# Patient Record
Sex: MALE | Race: BLACK OR AFRICAN AMERICAN | Employment: FULL TIME | URBAN - METROPOLITAN AREA
[De-identification: names, ages, dates, MRNs, and addresses within clinical notes are randomized per-mention and may not be internally consistent; named-entity substitution may affect disease eponyms.]

---

## 2022-02-16 ENCOUNTER — HOSPITAL ENCOUNTER (OUTPATIENT)
Dept: ULTRASOUND IMAGING | Age: 27
Discharge: HOME | End: 2022-02-16
Attending: UROLOGY
Payer: COMMERCIAL

## 2022-02-16 DIAGNOSIS — N50.819 PAIN IN TESTICLE, UNSPECIFIED LATERALITY: ICD-10-CM

## 2022-02-16 PROCEDURE — 76870 US EXAM SCROTUM: CPT

## 2022-06-15 ENCOUNTER — OFFICE VISIT (OUTPATIENT)
Dept: UROLOGY | Age: 27
End: 2022-06-15
Payer: COMMERCIAL

## 2022-06-15 DIAGNOSIS — N50.819 PAIN IN TESTICLE, UNSPECIFIED LATERALITY: Primary | ICD-10-CM

## 2022-06-15 LAB
BILIRUBIN, URINE, POC: NORMAL
BLOOD URINE, POC: NEGATIVE
GLUCOSE URINE, POC: NEGATIVE
KETONES, URINE, POC: NORMAL
LEUKOCYTE ESTERASE, URINE, POC: NEGATIVE
NITRITE, URINE, POC: NEGATIVE
PH, URINE, POC: 5.5 (ref 4.6–8)
PROTEIN,URINE, POC: NORMAL
SPECIFIC GRAVITY, URINE, POC: 1.03 (ref 1–1.03)
URINALYSIS CLARITY, POC: NORMAL
URINALYSIS COLOR, POC: NORMAL
UROBILINOGEN, POC: NORMAL

## 2022-06-15 PROCEDURE — 99212 OFFICE O/P EST SF 10 MIN: CPT | Performed by: UROLOGY

## 2022-06-15 PROCEDURE — 81003 URINALYSIS AUTO W/O SCOPE: CPT | Performed by: UROLOGY

## 2022-06-15 ASSESSMENT — ENCOUNTER SYMPTOMS
NAUSEA: 0
BACK PAIN: 0

## 2022-06-15 NOTE — PROGRESS NOTES
[facility]  [address] [address]  [phone]    Sonia Hutchison  : 1995    Chief Complaint   Patient presents with    Follow-up    Testicle Pain        HPI     Sonia Hutchison is a 23 y.o. male referred by Shawn MARI for evaluation and treatment of left scrotal pain. Had scrotal US in 3-21:   He had Scrotal US on 3/4/21, results again reviewed - Left varicocele and 2 epididymal head cyst noted. \"Left epididymal head is enlarged secondary to 12 x 9 x 8mm and 6 x 7 x 6mm epididymal cysts.      He states that he has been having left testicular pain since around . He noted it after straining at stool. Has continues to have dull left testicular pain when walking around   He has read about varicocele and thinks that he has one . He states that the left testis will lie sideways. Amanda Goes is described as 7 out of 10. Exam showed 2 cm sebaceous cyst left superior scrotum. normal testes, no palpable masses, mild tenderness left superior epididymis. Don't see a large varicocele on exam. Exam consistent with epididymitis. Gave doxycycline in . .  Scrotal US 22:   Impression   1. Left epididymal head cyst.   2. Otherwise unremarkable exam     He states that there is no pain today. He hasn't had pain in 4 months. No past medical history on file. No past surgical history on file. Current Outpatient Medications   Medication Sig Dispense Refill    doxycycline monohydrate (MONODOX) 100 MG capsule Take 100 mg by mouth 2 times daily       No current facility-administered medications for this visit.      No Known Allergies  Social History     Socioeconomic History    Marital status: Single     Spouse name: Not on file    Number of children: Not on file    Years of education: Not on file    Highest education level: Not on file   Occupational History    Not on file   Tobacco Use    Smoking status: Current Some Day Smoker    Smokeless tobacco: Not on file Substance and Sexual Activity    Alcohol use: Yes     Alcohol/week: 1.0 standard drink    Drug use: Not on file    Sexual activity: Not on file   Other Topics Concern    Not on file   Social History Narrative    Not on file     Social Determinants of Health     Financial Resource Strain:     Difficulty of Paying Living Expenses: Not on file   Food Insecurity:     Worried About Running Out of Food in the Last Year: Not on file    Janes of Food in the Last Year: Not on file   Transportation Needs:     Lack of Transportation (Medical): Not on file    Lack of Transportation (Non-Medical): Not on file   Physical Activity:     Days of Exercise per Week: Not on file    Minutes of Exercise per Session: Not on file   Stress:     Feeling of Stress : Not on file   Social Connections:     Frequency of Communication with Friends and Family: Not on file    Frequency of Social Gatherings with Friends and Family: Not on file    Attends Yazidism Services: Not on file    Active Member of [address] [place] [address] or Organizations: Not on file    Attends Club or Organization Meetings: Not on file    Marital Status: Not on file   Intimate Partner Violence:     Fear of Current or Ex-Partner: Not on file    Emotionally Abused: Not on file    Physically Abused: Not on file    Sexually Abused: Not on file   Housing Stability:     Unable to Pay for Housing in the Last Year: Not on file    Number of [place] in the Last Year: Not on file    Unstable Housing in the Last Year: Not on file     Family History   Problem Relation Age of Onset    Cancer Father     Hypertension Mother     Diabetes Mother        Review of Systems  Constitutional:   Negative for fever. Cardiovascular:  Negative for chest pain. GI:  Negative for nausea. Genitourinary:  Negative for urinary burning. Musculoskeletal:  Negative for back pain. There were no vitals taken for this visit.     Urinalysis  UA - Dipstick  Results for orders placed or performed in visit on 06/15/22   AMB POC URINALYSIS DIP STICK AUTO W/O MICRO   Result Value Ref Range    Color (UA POC)      Clarity (UA POC)      Glucose, Urine, POC Negative Negative    Bilirubin, Urine, POC Small Negative    KETONES, Urine, POC Trace Negative    Specific Gravity, Urine, POC 1.030 1.001 - 1.035    Blood (UA POC) Negative Negative    pH, Urine, POC 5.5 4.6 - 8.0    Protein, Urine, POC Trace Negative    Urobilinogen, POC Normal     Nitrite, Urine, POC Negative Negative    Leukocyte Esterase, Urine, POC Negative Negative       UA - Micro  WBC - 0  RBC - 0  Bacteria - 0  Epith - 0    Physical Exam    Assessment and Plan    ICD-10-CM    1. Pain in testicle, unspecified laterality  N50.819 AMB POC URINALYSIS DIP STICK AUTO W/O MICRO       Orders Placed This Encounter   Procedures    AMB POC URINALYSIS DIP STICK AUTO W/O MICRO       Follow-up and Dispositions    · Return if symptoms worsen or fail to improve.

## 2022-12-13 ENCOUNTER — HOSPITAL ENCOUNTER (EMERGENCY)
Age: 27
Discharge: HOME | End: 2022-12-14
Attending: EMERGENCY MEDICINE
Payer: COMMERCIAL

## 2022-12-13 DIAGNOSIS — J02.0 STREP PHARYNGITIS: Primary | ICD-10-CM

## 2022-12-13 LAB
FLUAV AG NPH QL IA: NEGATIVE
FLUBV AG NPH QL IA: NEGATIVE
SPECIMEN SOURCE: NORMAL
STREP, MOLECULAR: DETECTED

## 2022-12-13 PROCEDURE — 87804 INFLUENZA ASSAY W/OPTIC: CPT

## 2022-12-13 PROCEDURE — 99283 EMERGENCY DEPT VISIT LOW MDM: CPT | Performed by: EMERGENCY MEDICINE

## 2022-12-13 PROCEDURE — 87651 STREP A DNA AMP PROBE: CPT

## 2022-12-13 ASSESSMENT — LIFESTYLE VARIABLES
HOW MANY STANDARD DRINKS CONTAINING ALCOHOL DO YOU HAVE ON A TYPICAL DAY: 1 OR 2
HOW OFTEN DO YOU HAVE A DRINK CONTAINING ALCOHOL: MONTHLY OR LESS

## 2022-12-13 ASSESSMENT — PAIN - FUNCTIONAL ASSESSMENT: PAIN_FUNCTIONAL_ASSESSMENT: 0-10

## 2022-12-13 ASSESSMENT — PAIN DESCRIPTION - DESCRIPTORS: DESCRIPTORS: ACHING;GNAWING

## 2022-12-13 ASSESSMENT — PAIN DESCRIPTION - LOCATION: LOCATION: GENERALIZED;THROAT

## 2022-12-13 ASSESSMENT — PAIN SCALES - GENERAL: PAINLEVEL_OUTOF10: 8

## 2022-12-13 NOTE — Clinical Note
Dago Schwartz was seen and treated in our emergency department on 12/13/2022. He may return to work on 12/14/2022. Please excuse from work missed 12/13/22 overnight due to illness. Thank you! If you have any questions or concerns, please don't hesitate to call.       Micky Mace MD

## 2022-12-14 VITALS
HEIGHT: 70 IN | DIASTOLIC BLOOD PRESSURE: 84 MMHG | BODY MASS INDEX: 27.2 KG/M2 | SYSTOLIC BLOOD PRESSURE: 138 MMHG | OXYGEN SATURATION: 98 % | HEART RATE: 80 BPM | RESPIRATION RATE: 17 BRPM | WEIGHT: 190 LBS | TEMPERATURE: 99.8 F

## 2022-12-14 PROCEDURE — 6370000000 HC RX 637 (ALT 250 FOR IP): Performed by: EMERGENCY MEDICINE

## 2022-12-14 RX ORDER — AMOXICILLIN 500 MG/1
500 CAPSULE ORAL 2 TIMES DAILY
Qty: 20 CAPSULE | Refills: 0 | Status: SHIPPED | OUTPATIENT
Start: 2022-12-14 | End: 2022-12-14 | Stop reason: SDUPTHER

## 2022-12-14 RX ORDER — AMOXICILLIN 500 MG/1
500 CAPSULE ORAL 2 TIMES DAILY
Qty: 20 CAPSULE | Refills: 0 | Status: SHIPPED | OUTPATIENT
Start: 2022-12-14 | End: 2022-12-24

## 2022-12-14 RX ORDER — AMOXICILLIN 500 MG/1
1000 CAPSULE ORAL
Status: COMPLETED | OUTPATIENT
Start: 2022-12-14 | End: 2022-12-14

## 2022-12-14 RX ADMIN — AMOXICILLIN 1000 MG: 500 CAPSULE ORAL at 00:42

## 2022-12-14 ASSESSMENT — ENCOUNTER SYMPTOMS
COUGH: 0
SHORTNESS OF BREATH: 0
DIARRHEA: 0
EYE PAIN: 0
EYE DISCHARGE: 0
WHEEZING: 0
ABDOMINAL PAIN: 0
EYE REDNESS: 0
CONSTIPATION: 0
FACIAL SWELLING: 0
NAUSEA: 0
STRIDOR: 0
RHINORRHEA: 0
VOMITING: 0
VOICE CHANGE: 0
TROUBLE SWALLOWING: 0
COLOR CHANGE: 0
SORE THROAT: 1

## 2022-12-14 NOTE — ED NOTES
I have reviewed discharge instructions with the patient. The patient verbalized understanding. Patient left ED via Discharge Method: ambulatory to Home with self    Opportunity for questions and clarification provided. Patient given 1 scripts. To continue your aftercare when you leave the hospital, you may receive an automated call from our care team to check in on how you are doing. This is a free service and part of our promise to provide the best care and service to meet your aftercare needs.  If you have questions, or wish to unsubscribe from this service please call [phone]. Thank you for Choosing our [facility] Emergency Department.         Christel Andersen RN  12/14/22 2292

## 2022-12-14 NOTE — ED TRIAGE NOTES
Pt states that he recently went to [place] for vacation  states that he tested positive for COVID last week. Continues to have ear pressure and a sore throat.   States that he has been using OTC meds without relief

## 2022-12-14 NOTE — ED PROVIDER NOTES
dizziness, facial asymmetry, light-headedness, numbness and headaches. Psychiatric/Behavioral:  Negative for behavioral problems, confusion and hallucinations. The patient is not nervous/anxious and is not hyperactive. All other systems reviewed and are negative. History reviewed. No pertinent past medical history. History reviewed. No pertinent surgical history. Family History   Problem Relation Age of Onset    Cancer Father     Hypertension Mother     Diabetes Mother         Social Connections: Not on file        No Known Allergies     Vitals signs and nursing note reviewed. Patient Vitals for the past 4 hrs:   Temp Pulse Resp BP SpO2   12/13/22 2311 100.4 °F (38 °C) 82 16 (!) 142/85 98 %          Physical Exam  Vitals and nursing note reviewed. Constitutional:       General: He is not in acute distress. Appearance: Normal appearance. He is not ill-appearing or toxic-appearing. Comments: Lying semiupright on stretcher in no acute distress. Appears comfortable. Nontoxic-appearing. Not acutely ill-appearing. HENT:      Head: Normocephalic and atraumatic. Right Ear: Tympanic membrane, ear canal and external ear normal. No drainage, swelling or tenderness. No middle ear effusion. Tympanic membrane is not erythematous. Left Ear: Tympanic membrane, ear canal and external ear normal. No drainage, swelling or tenderness. No middle ear effusion. Tympanic membrane is not erythematous. Nose: Nose normal. No congestion or rhinorrhea. Mouth/Throat:      Mouth: Mucous membranes are moist. No oral lesions. Pharynx: Oropharyngeal exudate and posterior oropharyngeal erythema present. No pharyngeal swelling. Tonsils: Tonsillar exudate present. No tonsillar abscesses. 2+ on the right. 2+ on the left. Eyes:      General: No scleral icterus. Right eye: No discharge. Left eye: No discharge. Extraocular Movements: Extraocular movements intact. Right eye: Normal extraocular motion. Left eye: Normal extraocular motion. Conjunctiva/sclera: Conjunctivae normal.      Pupils: Pupils are equal, round, and reactive to light. Neck:      Thyroid: No thyromegaly. Cardiovascular:      Rate and Rhythm: Normal rate. Pulses: Normal pulses. Heart sounds: Normal heart sounds. No murmur heard. No friction rub. No gallop. Pulmonary:      Effort: Pulmonary effort is normal. No respiratory distress. Breath sounds: Normal breath sounds. No stridor. No wheezing, rhonchi or rales. Chest:      Chest wall: No tenderness. Abdominal:      General: Abdomen is flat. Bowel sounds are normal. There is no distension. Palpations: There is no mass. Tenderness: There is no abdominal tenderness. There is no guarding or rebound. Hernia: No hernia is present. Musculoskeletal:         General: No swelling, tenderness, deformity or signs of injury. Normal range of motion. Cervical back: Normal range of motion and neck supple. No rigidity or tenderness. Right lower leg: No edema. Left lower leg: No edema. Lymphadenopathy:      Cervical: Cervical adenopathy present. Skin:     General: Skin is warm and dry. Capillary Refill: Capillary refill takes less than 2 seconds. Coloration: Skin is not jaundiced or pale. Findings: No bruising, erythema or rash. Neurological:      General: No focal deficit present. Mental Status: He is alert and oriented to person, place, and time. Motor: No weakness. Coordination: Coordination normal.      Gait: Gait normal.   Psychiatric:         Mood and Affect: Mood normal.         Behavior: Behavior normal.         Thought Content: Thought content normal.        MDM  Number of Diagnoses or Management Options  Strep pharyngitis  Diagnosis management comments: Appears to have strep pharyngitis. Improved with treatment. Rx amoxicillin.   Will discharge home with recommended follow-up with PMD, recommend plenty of rest and plenty of fluids, recommend Tylenol and ibuprofen as needed for discomfort. Clear return precautions discussed. Amount and/or Complexity of Data Reviewed  Clinical lab tests: ordered and reviewed  Tests in the medicine section of CPT®: ordered and reviewed  Decide to obtain previous medical records or to obtain history from someone other than the patient: yes  Review and summarize past medical records: yes        Procedures    Labs Reviewed   RAPID INFLUENZA A/B ANTIGENS   GROUP A STREP SCREEN BY PCR        No orders to display            [place] Coma Scale  Eye Opening: Spontaneous  Best Verbal Response: Oriented  Best Motor Response: Obeys commands  [place] Coma Scale Score: 15                     Voice dictation software was used during the making of this note. This software is not perfect and grammatical and other typographical errors may be present. This note has not been completely proofread for errors.         Shayna Dominguez MD  12/14/22 [id number]